# Patient Record
Sex: FEMALE | Race: WHITE | NOT HISPANIC OR LATINO | Employment: OTHER | URBAN - METROPOLITAN AREA
[De-identification: names, ages, dates, MRNs, and addresses within clinical notes are randomized per-mention and may not be internally consistent; named-entity substitution may affect disease eponyms.]

---

## 2024-04-15 ENCOUNTER — HOSPITAL ENCOUNTER (EMERGENCY)
Facility: CLINIC | Age: 69
Discharge: HOME | End: 2024-04-15
Attending: STUDENT IN AN ORGANIZED HEALTH CARE EDUCATION/TRAINING PROGRAM | Admitting: STUDENT IN AN ORGANIZED HEALTH CARE EDUCATION/TRAINING PROGRAM
Payer: MEDICARE

## 2024-04-15 VITALS
OXYGEN SATURATION: 100 % | RESPIRATION RATE: 20 BRPM | TEMPERATURE: 98.2 F | DIASTOLIC BLOOD PRESSURE: 92 MMHG | WEIGHT: 152.78 LBS | SYSTOLIC BLOOD PRESSURE: 163 MMHG | BODY MASS INDEX: 28.84 KG/M2 | HEART RATE: 99 BPM | HEIGHT: 61 IN

## 2024-04-15 DIAGNOSIS — S05.02XA ABRASION OF LEFT CORNEA, INITIAL ENCOUNTER: ICD-10-CM

## 2024-04-15 DIAGNOSIS — S05.01XA ABRASION OF RIGHT CORNEA, INITIAL ENCOUNTER: ICD-10-CM

## 2024-04-15 PROCEDURE — 99283 EMERGENCY DEPT VISIT LOW MDM: CPT

## 2024-04-15 RX ORDER — TETRACAINE HYDROCHLORIDE 5 MG/ML
SOLUTION OPHTHALMIC
Status: DISCONTINUED
Start: 2024-04-15 | End: 2024-04-16 | Stop reason: HOSPADM

## 2024-04-15 RX ORDER — CIPROFLOXACIN HYDROCHLORIDE 3.5 MG/ML
2 SOLUTION/ DROPS TOPICAL 4 TIMES DAILY
Qty: 5 ML | Refills: 0 | Status: SHIPPED | OUTPATIENT
Start: 2024-04-15 | End: 2024-04-20

## 2024-04-15 ASSESSMENT — COLUMBIA-SUICIDE SEVERITY RATING SCALE - C-SSRS
1. IN THE PAST MONTH, HAVE YOU WISHED YOU WERE DEAD OR WISHED YOU COULD GO TO SLEEP AND NOT WAKE UP?: NO
2. HAVE YOU ACTUALLY HAD ANY THOUGHTS OF KILLING YOURSELF IN THE PAST MONTH?: NO
6. HAVE YOU EVER DONE ANYTHING, STARTED TO DO ANYTHING, OR PREPARED TO DO ANYTHING TO END YOUR LIFE?: NO

## 2024-04-15 ASSESSMENT — ACTIVITIES OF DAILY LIVING (ADL)
ADLS_ACUITY_SCORE: 35
ADLS_ACUITY_SCORE: 35

## 2024-04-15 NOTE — ED TRIAGE NOTES
PT arrives with bilateral eye burning. She states she had a corneal abrasion that she had a contact placed over. She notes within the last couple days the pain has becoming unbearable and now she is having pain in both eyes. No new trauma noted.      Triage Assessment (Adult)       Row Name 04/15/24 2232          Triage Assessment    Airway WDL WDL        Respiratory WDL    Respiratory WDL WDL        Peripheral/Neurovascular WDL    Peripheral Neurovascular WDL WDL        Cognitive/Neuro/Behavioral WDL    Cognitive/Neuro/Behavioral WDL WDL

## 2024-04-16 ENCOUNTER — TELEPHONE (OUTPATIENT)
Dept: OPHTHALMOLOGY | Facility: CLINIC | Age: 69
End: 2024-04-16
Payer: MEDICARE

## 2024-04-16 NOTE — TELEPHONE ENCOUNTER
[facility] Call Center    Phone Message    May a detailed message be left on voicemail: yes     Reason for Call: Appointment Intake    Referring Provider Name: Nita Kyle PA-C  Diagnosis and/or Symptoms: Abrasion of left cornea, initial encounter [S05.02XA]Abrasion of right cornea, initial encounter [S05.01XA]    Emergency: 1-2 Days    Patient is here until Friday 4/19 and my next available was next week at all locations, Patient not sure what city she is in    Thank you,    Action Taken: Message routed to:  [facility] ([facility]): eye    Travel Screening: Not Applicable

## 2024-04-16 NOTE — ED PROVIDER NOTES
"  History     Chief Complaint:  Eye Injury       HPI   Brigitte Alexander is a 68 year old female who presents with concern for bilateral eye pain.  Patient reports that on March 20 history of diagnosed with a right eye corneal abrasion.  Symptoms were initially improving however probably worsened.  She saw her eye doctor who states that he believes she blinks the scab off the abrasion.  He applied a clear contact lens to the eye to help with healing.  Patient wore this for 4 days as directed.  Yesterday, patient began to experience worsening right eye pain once more.  She endorses photophobia, eye pain, and similar symptoms to when she had abrasion diagnosed.  This morning, she now has pain in the left eye as well.  She denies any crusting or discharge from the eye.  She does not wear contacts at baseline.      Independent Historian:   None - Patient Only    Review of External Notes:   None       Medications:    ciprofloxacin (CILOXAN) 0.3 % ophthalmic solution        Past Medical History:    No past medical history on file.    Past Surgical History:    No past surgical history on file.     Physical Exam   Patient Vitals for the past 24 hrs:   BP Temp Temp src Pulse Resp SpO2 Height Weight   04/15/24 1811 (!) 163/92 98.2  F (36.8  C) Oral 99 20 100 % 1.549 m (5' 1\") 69.3 kg (152 lb 12.5 oz)        Physical Exam  General: Alert and cooperative with exam. Patient in no apparent distress. Normal mentation.  Head:  Scalp is NC/AT  Eyes:  EOM intact.  Bilateral conjunctival injection with scant clear drainage noted bilaterally as well.  No periorbital swelling.  ENT:  The external nose and ears are normal.   Neck:  Normal range of motion without rigidity.  CV:  Warm and well perfused  Resp:  Breathing comfortably on room air  Skin:  Warm and dry, No rash or lesions noted.  Neuro:  Oriented x 3. No gross motor deficits.      Emergency Department Course     Procedures   None    Emergency Department Course & " Assessments:    Interventions:  Medications   tetracaine (PONTOCAINE) 0.5 % ophthalmic solution (has no administration in time range)   fluorescein (FUL-LIZETH) 1 MG ophthalmic strip (has no administration in time range)       Independent Interpretation (X-rays, CTs, rhythm strip):  None    Consultations/Discussion of Management or Tests:  None        Social Determinants of Health affecting care:   None    Disposition:  The patient was discharged.     Impression & Plan    CMS Diagnoses: None     Medical Decision Making:  Patient presents with bilateral eye pain and concern for repeat abrasions. Broad differential was considered.  The exam is significant for abnormality on fluorescein exam. This is consistent with corneal abrasion. No foreign bodies in eyes or lids noted.  No evidence of ruptured globe, intra-occular foreign body, hyphema, retro-bulbar hematoma, orbital fracture, lens dislocation, retinal detachment, or traumatic iritis.  No evidence of other serious non-traumatic eye disease such as corneal ulcer, dendritic lesions, endopthalmitis, angle closure glaucoma, etc. patient was diagnosed with right eye corneal abrasion that she has images of fluorescein stain at that time.  Upon comparison, corneal abrasion seems unchanged.  Left corneal abrasion is new as of today.  Patient asymptomatic to the left eye until today.  Uncertain as to what is causing patient's recurrent corneal abrasions however I recommend she follow-up with ophthalmology urgently to assess this.  She is from out of town, provided emergent ophthalmology referral today so she can be seen as soon as possible as outpatient.    Patient will be placed on antibiotic drops as below, and instructed to avoid wearing contact lenses until cleared by ophthalmology.  Discussed symptomatic treatment for pain control. Patient was given follow-up for ophthalmology, with instruction to schedule an appointment to be seen in 1-2 days.  Return to ED if new or  worsening symptoms.        Diagnosis:    ICD-10-CM    1. Abrasion of left cornea, initial encounter  S05.02XA Adult Eye  Referral      2. Abrasion of right cornea, initial encounter  S05.01XA Adult Eye  Referral           Discharge Medications:  New Prescriptions    CIPROFLOXACIN (CILOXAN) 0.3 % OPHTHALMIC SOLUTION    Place 2 drops into both eyes 4 times daily for 5 days          4/15/2024   Nita Kyle, Nita Ruelas PA-C  04/15/24 2315

## 2024-04-16 NOTE — DISCHARGE INSTRUCTIONS
Antibiotic drops were sent to your pharmacy at [facility].  I have also ordered a ophthalmology emergent referral.  They will call you likely tomorrow to help coordinate follow-up.  You can also call around to local eye clinics to see if you can get in elsewhere.  Continue ibuprofen and Tylenol for any ongoing ocular pain.  If you develop any worsening symptoms, return to ER.

## 2024-04-17 NOTE — TELEPHONE ENCOUNTER
Pt with reoccurring cornea abrasion in past several months     New abrasion on Sunday and seen in ED     Pt having continued symptoms     Offered appt tomorrow in acute eye clinic and pt accepts     Pt aware of date/time/location/duration/hospital based clinic.     Junior Waggoner RN 6:08 PM 04/17/24

## 2024-04-18 ENCOUNTER — OFFICE VISIT (OUTPATIENT)
Dept: OPHTHALMOLOGY | Facility: CLINIC | Age: 69
End: 2024-04-18
Attending: OPHTHALMOLOGY
Payer: MEDICARE

## 2024-04-18 DIAGNOSIS — H18.833 RECURRENT EROSION OF BOTH CORNEAS: Primary | ICD-10-CM

## 2024-04-18 PROCEDURE — 92002 INTRM OPH EXAM NEW PATIENT: CPT | Mod: GC | Performed by: OPHTHALMOLOGY

## 2024-04-18 PROCEDURE — 99213 OFFICE O/P EST LOW 20 MIN: CPT | Performed by: STUDENT IN AN ORGANIZED HEALTH CARE EDUCATION/TRAINING PROGRAM

## 2024-04-18 PROCEDURE — G0463 HOSPITAL OUTPT CLINIC VISIT: HCPCS | Performed by: STUDENT IN AN ORGANIZED HEALTH CARE EDUCATION/TRAINING PROGRAM

## 2024-04-18 ASSESSMENT — TONOMETRY
OS_IOP_MMHG: 13
OD_IOP_MMHG: 12
IOP_METHOD: ICARE

## 2024-04-18 ASSESSMENT — REFRACTION_WEARINGRX
OD_CYLINDER: +1.25
OS_AXIS: 003
SPECS_TYPE: PAL
OD_SPHERE: -3.00
OD_ADD: +2.50
OS_ADD: +2.50
OS_CYLINDER: +3.00
OD_AXIS: 151
OS_SPHERE: -3.75

## 2024-04-18 ASSESSMENT — CONF VISUAL FIELD
OD_SUPERIOR_TEMPORAL_RESTRICTION: 0
OD_INFERIOR_TEMPORAL_RESTRICTION: 0
OD_INFERIOR_NASAL_RESTRICTION: 0
OS_SUPERIOR_NASAL_RESTRICTION: 0
OS_INFERIOR_NASAL_RESTRICTION: 0
OS_INFERIOR_TEMPORAL_RESTRICTION: 0
OS_SUPERIOR_TEMPORAL_RESTRICTION: 0
OS_NORMAL: 1
OD_SUPERIOR_NASAL_RESTRICTION: 0
OD_NORMAL: 1

## 2024-04-18 ASSESSMENT — VISUAL ACUITY
OS_PH_CC+: -1
OS_CC+: -1
OD_PH_CC: 20/50
OS_PH_CC: 20/50
OS_CC: 20/70
OD_CC: 20/50
CORRECTION_TYPE: GLASSES
METHOD: SNELLEN - LINEAR

## 2024-04-18 ASSESSMENT — EXTERNAL EXAM - RIGHT EYE: OD_EXAM: NORMAL

## 2024-04-18 ASSESSMENT — SLIT LAMP EXAM - LIDS
COMMENTS: NORMAL
COMMENTS: NORMAL

## 2024-04-18 ASSESSMENT — EXTERNAL EXAM - LEFT EYE: OS_EXAM: NORMAL

## 2024-04-18 NOTE — NURSING NOTE
Chief Complaints and History of Present Illnesses   Patient presents with    Corneal Abrasion Follow Up     Chief Complaint(s) and History of Present Illness(es)       Corneal Abrasion Follow Up              Laterality: left eye    Duration: 3 days              Comments    Pt. States that she woke up on Sunday morning and had severe pain LE. Eyes have been more dry in the mornings lately. Was also extremely light sensitive that morning. Has been having corneal erosions RE since scratching RE in March. Has been using ciprofloxaxcin QID LE. VA has been blurry BE.   Alejandra Baig COT 12:35 PM April 18, 2024

## 2024-04-18 NOTE — PROGRESS NOTES
"Ophthalmology Acute Clinic     Chief Complaint(s) and History of Present Illness(es)       Corneal Abrasion Follow Up    In left eye.  Duration of 3 days.             Comments    Pt. States that she woke up on Sunday morning and had severe pain LE. Eyes have been more dry in the mornings lately. Was also extremely light sensitive that morning. Has been having corneal erosions RE since scratching RE in March. Has been using ciprofloxaxcin QID LE. VA has been blurry BE.   Alejandra Baig COT 12:35 PM April 18, 2024                        HPI:   Brigitte Alexander is a 68 year old female who presents for corneal abrasion left eye diagnosed in ED 4/15/24. She cannot think of any provoking factor except [place]. She was started on ciprofloxacin four times daily each eye.     In March 20 she had epi defect right eye after blunt trauma with tip of eye dropper. That recurred again in late March. Right eye is hurting again today.     ROS    ENT: Normal  Cardiac: Normal  Derm: Normal  Respiratory: Normal  Muscle/Skel: Normal  Allergic/Immunology: Normal  Neuro: Normal  Psych: Normal  Endocrine: Normal  Heme: Normal  Rheumatologic: Normal  : Normal  GI: Normal  Constitutional: Normal         No results found for: \"A1C\"    Past Ocular history:   - Glasses: yes  - Contact lens wear: no  - Ocular Surgical History: BULB  - Current Eye drops: ciprofloxacin four times daily, NS     PMH: History reviewed. No pertinent past medical history.      FH: Father has glaucoma and AMD.     Review of systems for the eyes was negative other than the pertinent positives/negatives listed in the HPI.        Assessment & Plan      Brigitte Alexander is a 68 year old female with the following diagnoses:   1. Recurrent erosion of both corneas         #Corneal erosions each eye with recurrence right eye   Delayed wound healing, appearance of map lines each eye, and recurrence of erosion right eye are concerning for EBMD. Discussed " this with patient and she will see her ophthalmologist in [place] and discuss with them. Plans to see her ophthalmologist on Monday (leaving tomorrow to return to [place])  -BCL each eye  with strict instructions that it must be removed in a week  -Continue ciprofloxacin four times daily each eye while BCL is in place      Patient disposition:   No follow-ups on file.    Patient seen with Dr. Martinez    Thank you for entrusting us with your care  Nu Fraire MD  Resident Physician, PGY3  Department of Ophthalmology  04/18/24 1:11 PM